# Patient Record
Sex: MALE | URBAN - METROPOLITAN AREA
[De-identification: names, ages, dates, MRNs, and addresses within clinical notes are randomized per-mention and may not be internally consistent; named-entity substitution may affect disease eponyms.]

---

## 2018-12-12 ENCOUNTER — OFFICE VISIT (OUTPATIENT)
Dept: URBAN - METROPOLITAN AREA CLINIC 76 | Facility: CLINIC | Age: 83
End: 2018-12-12
Payer: MEDICARE

## 2018-12-12 DIAGNOSIS — H43.813 VITREOUS DEGENERATION, BILATERAL: ICD-10-CM

## 2018-12-12 DIAGNOSIS — Z96.1 PRESENCE OF INTRAOCULAR LENS: Primary | ICD-10-CM

## 2018-12-12 DIAGNOSIS — H35.363 DRUSEN (DEGENERATIVE) OF MACULA, BILATERAL: ICD-10-CM

## 2018-12-12 DIAGNOSIS — H02.053 TRICHIASIS WITHOUT ENTROPION RIGHT EYE, UNSPECIFIED EYELID: ICD-10-CM

## 2018-12-12 PROCEDURE — 99214 OFFICE O/P EST MOD 30 MIN: CPT | Performed by: OPTOMETRIST

## 2018-12-12 ASSESSMENT — KERATOMETRY
OS: 44.38
OD: 44.00

## 2018-12-12 ASSESSMENT — INTRAOCULAR PRESSURE
OD: 12
OS: 10

## 2018-12-12 ASSESSMENT — VISUAL ACUITY
OS: 20/25
OD: 20/25

## 2018-12-12 NOTE — IMPRESSION/PLAN
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 Plan: Discussed diagnosis in detail with patient. Counseling given about the benefits and/or risks of the Age-Related Eye Disease Study (AREDS) formulation for preventing progression of age-related macular degeneration (AMD). Add lutein 20-30 mg, zeaxanthin 2-5mg per day. Will continue to observe condition and or symptoms. Call if [address] worsens. Dispensed and explained use of [employer] grid.

## 2018-12-12 NOTE — IMPRESSION/PLAN
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 Removed 5 eyelashes from RUL/RLL in office with forceps. Plan: Discussed diagnosis with patient. Recommend using AT's 3-4x a day for comfort. Call if symptoms worsen.

## 2018-12-12 NOTE — IMPRESSION/PLAN
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 Plan: Posterior vitreous detachment accounts for the patient's complaints. There is no evidence of retinal pathology. All signs and risks of retinal detachment and tears were discussed in detail. Patient instructed to call the office immediately if any symptoms noted. No further treatment required, unless signs/symptoms of retinal detachment develop.

## 2019-12-19 ENCOUNTER — OFFICE VISIT (OUTPATIENT)
Dept: URBAN - METROPOLITAN AREA CLINIC 76 | Facility: CLINIC | Age: 84
End: 2019-12-19
Payer: MEDICARE

## 2019-12-19 DIAGNOSIS — H52.4 PRESBYOPIA: ICD-10-CM

## 2019-12-19 PROCEDURE — 92014 COMPRE OPH EXAM EST PT 1/>: CPT | Performed by: OPTOMETRIST

## 2019-12-19 ASSESSMENT — VISUAL ACUITY
OD: 20/25-
OS: 20/20-

## 2019-12-19 ASSESSMENT — INTRAOCULAR PRESSURE
OD: 12
OS: 11

## 2019-12-19 ASSESSMENT — KERATOMETRY
OD: 44.25
OS: 44.38

## 2019-12-19 NOTE — IMPRESSION/PLAN
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 Plan: Rediscussed diagnosis in detail with patient. Counseling given about the benefits and/or risks of the Age-Related Eye Disease Study (AREDS) formulation for preventing progression of age-related macular degeneration (AMD). Add lutein 20-30 mg, zeaxanthin 2-5mg per day. Will continue to observe condition and or symptoms. Call if [address] worsens. Dispensed and explained use of [employer] grid.